# Patient Record
Sex: FEMALE | Race: WHITE | Employment: PART TIME | URBAN - METROPOLITAN AREA
[De-identification: names, ages, dates, MRNs, and addresses within clinical notes are randomized per-mention and may not be internally consistent; named-entity substitution may affect disease eponyms.]

---

## 2018-08-28 ENCOUNTER — OFFICE VISIT (OUTPATIENT)
Dept: FAMILY MEDICINE CLINIC | Age: 33
End: 2018-08-28
Payer: MEDICARE

## 2018-08-28 VITALS
OXYGEN SATURATION: 95 % | HEART RATE: 70 BPM | RESPIRATION RATE: 18 BRPM | WEIGHT: 124 LBS | BODY MASS INDEX: 21.97 KG/M2 | TEMPERATURE: 97.5 F | SYSTOLIC BLOOD PRESSURE: 118 MMHG | DIASTOLIC BLOOD PRESSURE: 80 MMHG | HEIGHT: 63 IN

## 2018-08-28 DIAGNOSIS — J02.9 SORE THROAT: ICD-10-CM

## 2018-08-28 DIAGNOSIS — J02.0 STREP THROAT: Primary | ICD-10-CM

## 2018-08-28 LAB — S PYO AG THROAT QL: POSITIVE

## 2018-08-28 PROCEDURE — G8427 DOCREV CUR MEDS BY ELIG CLIN: HCPCS | Performed by: NURSE PRACTITIONER

## 2018-08-28 PROCEDURE — G8420 CALC BMI NORM PARAMETERS: HCPCS | Performed by: NURSE PRACTITIONER

## 2018-08-28 PROCEDURE — 4004F PT TOBACCO SCREEN RCVD TLK: CPT | Performed by: NURSE PRACTITIONER

## 2018-08-28 PROCEDURE — 87880 STREP A ASSAY W/OPTIC: CPT | Performed by: NURSE PRACTITIONER

## 2018-08-28 PROCEDURE — 99201 PR OFFICE OUTPATIENT NEW 10 MINUTES: CPT | Performed by: NURSE PRACTITIONER

## 2018-08-28 RX ORDER — AZITHROMYCIN 250 MG/1
TABLET, FILM COATED ORAL
Qty: 1 PACKET | Refills: 0 | Status: SHIPPED | OUTPATIENT
Start: 2018-08-28 | End: 2019-04-22

## 2018-08-28 RX ORDER — PAROXETINE HYDROCHLORIDE 20 MG/1
20 TABLET, FILM COATED ORAL EVERY MORNING
COMMUNITY
Start: 2018-08-09

## 2018-08-28 ASSESSMENT — ENCOUNTER SYMPTOMS
DIARRHEA: 0
ABDOMINAL PAIN: 0
SORE THROAT: 1
SWOLLEN GLANDS: 1
VOMITING: 0
SHORTNESS OF BREATH: 0
COUGH: 0
RHINORRHEA: 0

## 2018-08-28 ASSESSMENT — PATIENT HEALTH QUESTIONNAIRE - PHQ9
1. LITTLE INTEREST OR PLEASURE IN DOING THINGS: 0
SUM OF ALL RESPONSES TO PHQ QUESTIONS 1-9: 0
SUM OF ALL RESPONSES TO PHQ QUESTIONS 1-9: 0
2. FEELING DOWN, DEPRESSED OR HOPELESS: 0
SUM OF ALL RESPONSES TO PHQ9 QUESTIONS 1 & 2: 0

## 2018-08-28 NOTE — PATIENT INSTRUCTIONS
fluids, such as tea or soup, may help your throat feel better. · Eat soft solids and drink plenty of clear liquids. Flavored ice pops, ice cream, scrambled eggs, sherbet, and gelatin dessert (such as Jell-O) may also soothe the throat. · Get lots of rest.  · Do not smoke, and avoid secondhand smoke. If you need help quitting, talk to your doctor about stop-smoking programs and medicines. These can increase your chances of quitting for good. · Use a vaporizer or humidifier to add moisture to the air in your bedroom. Follow the directions for cleaning the machine. When should you call for help? Call your doctor now or seek immediate medical care if:    · You have a new or higher fever.     · You have a fever with a stiff neck or severe headache.     · You have new or worse trouble swallowing.     · Your sore throat gets much worse on one side.     · Your pain becomes much worse on one side of your throat.    Watch closely for changes in your health, and be sure to contact your doctor if:    · You are not getting better after 2 days (48 hours).     · You do not get better as expected. Where can you learn more? Go to https://[employer].[email or web address][employer]. org and sign in to your [employer] account. Enter K625 in the [employer] box to learn more about \"Strep Throat: Care Instructions. \"     If you do not have an account, please click on the \"Sign Up Now\" link. Current as of: May 12, 2017  Content Version: 11.7  © 3413-1217 [employer]. Care instructions adapted under license by [facility] ([facility]). If you have questions about a medical condition or this instruction, always ask your healthcare professional. [place] [address] any warranty or liability for your use of this information.

## 2018-08-28 NOTE — PROGRESS NOTES
[address] [address]  Dept: [phone]  Dept Fax: [phone]    Andreea Hylton is a 27 y.o. female who presents to the urgent care today for her medical conditions/complaints as noted below. Robert Hodges is c/o of Pharyngitis (x 1 day - white bumps, sore to swallow )      HPI:     Sore throat  Feels like when had strep  Can swallow  No meds tried      Pharyngitis   This is a new problem. The current episode started yesterday. The problem occurs constantly. The problem has been gradually worsening. Associated symptoms include a sore throat and swollen glands. Pertinent negatives include no abdominal pain, congestion, coughing, fever, headaches, rash or vomiting. The symptoms are aggravated by swallowing. She has tried nothing for the symptoms. Past Medical History:   Diagnosis Date    Kidney infection     Kidney stone     Migraine         Current Outpatient Prescriptions   Medication Sig Dispense Refill    PARoxetine (PAXIL) 20 MG tablet       ASPIRIN-ACETAMINOPHEN PO Take by mouth      azithromycin (ZITHROMAX) 250 MG tablet Take 2 tabs (500 mg) on Day 1, and take 1 tab (250 mg) on days 2 through 5. 1 packet 0     No current facility-administered medications for this visit. Allergies   Allergen Reactions    Amoxicillin     Dicloxacillin Hives    Pcn [Penicillins]        Subjective:      Review of Systems   Constitutional: Negative for fever. HENT: Positive for sore throat. Negative for congestion and rhinorrhea. Respiratory: Negative for cough and shortness of breath. Gastrointestinal: Negative for abdominal pain, diarrhea and vomiting. Skin: Negative for rash. Neurological: Negative for headaches. All other systems reviewed and are negative. Objective:     Physical Exam   Constitutional: She is oriented to person, place, and time. She appears well-developed and well-nourished.  No

## 2018-08-31 ENCOUNTER — HOSPITAL ENCOUNTER (EMERGENCY)
Facility: CLINIC | Age: 33
Discharge: HOME | End: 2018-08-31
Attending: EMERGENCY MEDICINE
Payer: OTHER MISCELLANEOUS

## 2018-08-31 ENCOUNTER — APPOINTMENT (OUTPATIENT)
Dept: CT IMAGING | Facility: CLINIC | Age: 33
End: 2018-08-31
Payer: OTHER MISCELLANEOUS

## 2018-08-31 ENCOUNTER — APPOINTMENT (OUTPATIENT)
Dept: GENERAL RADIOLOGY | Facility: CLINIC | Age: 33
End: 2018-08-31
Payer: OTHER MISCELLANEOUS

## 2018-08-31 VITALS
HEIGHT: 63 IN | TEMPERATURE: 97.9 F | OXYGEN SATURATION: 98 % | DIASTOLIC BLOOD PRESSURE: 67 MMHG | RESPIRATION RATE: 14 BRPM | BODY MASS INDEX: 22.15 KG/M2 | SYSTOLIC BLOOD PRESSURE: 99 MMHG | HEART RATE: 62 BPM | WEIGHT: 125 LBS
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
## 2018-08-31 DIAGNOSIS — S00.83XA CONTUSION OF FACE, INITIAL ENCOUNTER: ICD-10-CM

## 2018-08-31 DIAGNOSIS — S80.01XA CONTUSION OF RIGHT KNEE, INITIAL ENCOUNTER: ICD-10-CM

## 2018-08-31 DIAGNOSIS — S81.011A LACERATION OF RIGHT KNEE, INITIAL ENCOUNTER: ICD-10-CM

## 2018-08-31 DIAGNOSIS — S40.022A CONTUSION OF LEFT UPPER ARM, INITIAL ENCOUNTER: ICD-10-CM

## 2018-08-31 PROCEDURE — 6370000000 HC RX 637 (ALT 250 FOR IP): Performed by: EMERGENCY MEDICINE

## 2018-08-31 PROCEDURE — 70450 CT HEAD/BRAIN W/O DYE: CPT

## 2018-08-31 PROCEDURE — 73060 X-RAY EXAM OF HUMERUS: CPT

## 2018-08-31 PROCEDURE — 6360000002 HC RX W HCPCS: Performed by: EMERGENCY MEDICINE

## 2018-08-31 PROCEDURE — 2500000003 HC RX 250 WO HCPCS: Performed by: EMERGENCY MEDICINE

## 2018-08-31 PROCEDURE — 90715 TDAP VACCINE 7 YRS/> IM: CPT | Performed by: EMERGENCY MEDICINE

## 2018-08-31 PROCEDURE — 99284 EMERGENCY DEPT VISIT MOD MDM: CPT

## 2018-08-31 PROCEDURE — 73562 X-RAY EXAM OF KNEE 3: CPT

## 2018-08-31 PROCEDURE — 90471 IMMUNIZATION ADMIN: CPT | Performed by: EMERGENCY MEDICINE

## 2018-08-31 PROCEDURE — 72125 CT NECK SPINE W/O DYE: CPT

## 2018-08-31 PROCEDURE — 70486 CT MAXILLOFACIAL W/O DYE: CPT

## 2018-08-31 PROCEDURE — 12002 RPR S/N/AX/GEN/TRNK2.6-7.5CM: CPT

## 2018-08-31 RX ORDER — HYDROCODONE BITARTRATE AND ACETAMINOPHEN 5; 325 MG/1; MG/1
1 TABLET ORAL ONCE
Status: COMPLETED | OUTPATIENT
Start: 2018-08-31 | End: 2018-08-31

## 2018-08-31 RX ORDER — CETIRIZINE HYDROCHLORIDE 10 MG/1
10 TABLET ORAL DAILY
COMMUNITY
End: 2019-09-12

## 2018-08-31 RX ORDER — LIDOCAINE/RACEPINEP/TETRACAINE 4-0.05-0.5
SOLUTION WITH PREFILLED APPLICATOR (ML) TOPICAL ONCE
Status: COMPLETED | OUTPATIENT
Start: 2018-08-31 | End: 2018-08-31

## 2018-08-31 RX ORDER — GINSENG 100 MG
CAPSULE ORAL ONCE
Status: COMPLETED | OUTPATIENT
Start: 2018-08-31 | End: 2018-08-31

## 2018-08-31 RX ADMIN — Medication 3 ML: at 17:45

## 2018-08-31 RX ADMIN — HYDROCODONE BITARTRATE AND ACETAMINOPHEN 1 TABLET: 5; 325 TABLET ORAL at 17:45

## 2018-08-31 RX ADMIN — TETANUS TOXOID, REDUCED DIPHTHERIA TOXOID AND ACELLULAR PERTUSSIS VACCINE, ADSORBED 0.5 ML: 5; 2.5; 8; 8; 2.5 SUSPENSION INTRAMUSCULAR at 17:45

## 2018-08-31 RX ADMIN — BACITRACIN: 500 OINTMENT TOPICAL at 18:40

## 2018-08-31 ASSESSMENT — PAIN DESCRIPTION - PAIN TYPE
TYPE: ACUTE PAIN

## 2018-08-31 ASSESSMENT — PAIN SCALES - GENERAL
PAINLEVEL_OUTOF10: 10
PAINLEVEL_OUTOF10: 10
PAINLEVEL_OUTOF10: 8

## 2018-08-31 NOTE — ED PROVIDER NOTES
PAROXETINE (PAXIL) 20 MG TABLET    Take 20 mg by mouth every morning        ALLERGIES     is allergic to amoxicillin; dicloxacillin; and pcn [penicillins]. FAMILY HISTORY     has no family status information on file. family history is not on file. SOCIAL HISTORY      reports that she has been smoking. She has been smoking about 0.25 packs per day. She has never used smokeless tobacco. She reports that she drinks alcohol. She reports that she does not use drugs. PHYSICAL EXAM       ED Triage Vitals [08/31/18 1728]   BP Temp Temp Source Pulse Resp SpO2 Height Weight   (!) 102/59 97.9 °F (36.6 °C) Oral 64 16 98 % 5' 3\" (1.6 m) 125 lb (56.7 kg)       Constitutional: Alert, oriented x3, nontoxic, answering questions appropriately, acting properly for age, in no acute distress   HEENT: Extraocular muscles intact, mucus membranes moist, TMs clear bilaterally, no posterior pharyngeal erythema or exudates, Pupils equal, round, reactive to light, periorbital swelling with infraorbital ecchymosis and swelling greater than supraorbital.  No signs of entrapment. Neck: Trachea midline mild posterior paraspinal musculature tenderness to palpation hypersensitive to touch. Cardiovascular: Regular rhythm and rate no S3, S4, or murmurs   Respiratory: Clear to auscultation bilaterally no wheezes, rhonchi, rales, no respiratory distress no tachypnea no retractions no hypoxia  Gastrointestinal: Soft, nontender, nondistended, positive bowel sounds. No rebound, rigidity, or guarding. Musculoskeletal: Left upper shoulder no pain at the shoulder elbow or wrist.  Radial and ulnar arteries intact. Capillary refill less than 2 seconds there is ecchymosis with swelling to the mid lateral humerus. Ecchymosis measuring approximately 10 cm in diameter. Right lower extremity there is no pain at the hip knee or ankle.   There is a linear 4 cm laceration to the anteromedial aspect of the right knee with diffuse tenderness no ecchymosis no swelling. Neurologic: No motor or sensory deficits. Skin: Warm and dry     Physical Exam  DIFFERENTIAL DIAGNOSIS/ MDM:     Update tetanus. Pain control. CT head facial bones and cervical spine. X-ray left humerus, right knee. Suture repair. DIAGNOSTIC RESULTS     EKG: All EKG's are interpreted by the Emergency Department Physician who either signs or Co-signs this chart in the absence of a cardiologist.        Not indicated unless otherwise documented above    LABS:  No results found for this visit on 08/31/18. Not indicated unless otherwise documented above    RADIOLOGY:   I reviewed the radiologist interpretations:    CT Cervical Spine WO Contrast   Final Result   Unremarkable CT examination of the cervical spine. CT FACIAL BONES WO CONTRAST   Final Result   Negative CT brain with no acute intracranial abnormality. Negative CT with no acute traumatic injury of the facial bones. Incidental bilateral maxillary sinusitis as described. CT Head WO Contrast   Final Result   Negative CT brain with no acute intracranial abnormality. Negative CT with no acute traumatic injury of the facial bones. Incidental bilateral maxillary sinusitis as described. XR KNEE RIGHT (3 VIEWS)   Final Result   No acute bony abnormality of the knee. XR HUMERUS LEFT (MIN 2 VIEWS)   Final Result   No acute osseous abnormality.              Not indicated unless otherwise documented above    EMERGENCY DEPARTMENT COURSE:     The patient was given the following medications:  Orders Placed This Encounter   Medications    Tetanus-Diphth-Acell Pertussis (BOOSTRIX) injection 0.5 mL    HYDROcodone-acetaminophen (NORCO) 5-325 MG per tablet 1 tablet    lidocaine-EPINEPHrine-tetracaine gel    bacitracin ointment        Vitals:    Vitals:    08/31/18 1728   BP: (!) 102/59   Pulse: 64   Resp: 16   Temp: 97.9 °F (36.6 °C)   TempSrc: Oral   SpO2: 98%   Weight: 56.7 kg (125 lb) of this note were completed with a voice recognition program.  Efforts were made to edit the dictations but occasionally words are mis-transcribed.)    Rosario DO  Attending Emergency Physician        Robert Encarnacion DO  08/31/18 2790

## 2019-04-22 ENCOUNTER — HOSPITAL ENCOUNTER (EMERGENCY)
Facility: CLINIC | Age: 34
Discharge: HOME | End: 2019-04-22
Attending: EMERGENCY MEDICINE
Payer: MEDICARE

## 2019-04-22 VITALS
TEMPERATURE: 98 F | BODY MASS INDEX: 22.14 KG/M2 | HEART RATE: 86 BPM | OXYGEN SATURATION: 98 % | DIASTOLIC BLOOD PRESSURE: 82 MMHG | WEIGHT: 125 LBS | SYSTOLIC BLOOD PRESSURE: 109 MMHG | RESPIRATION RATE: 16 BRPM

## 2019-04-22 DIAGNOSIS — J01.00 ACUTE MAXILLARY SINUSITIS, RECURRENCE NOT SPECIFIED: Primary | ICD-10-CM

## 2019-04-22 DIAGNOSIS — J40 BRONCHITIS: ICD-10-CM

## 2019-04-22 PROCEDURE — 99282 EMERGENCY DEPT VISIT SF MDM: CPT

## 2019-04-22 RX ORDER — AZITHROMYCIN 250 MG/1
TABLET, FILM COATED ORAL
Qty: 1 PACKET | Refills: 0 | Status: SHIPPED | OUTPATIENT
Start: 2019-04-22 | End: 2019-05-02

## 2019-04-22 RX ORDER — PREDNISONE 10 MG/1
TABLET ORAL
Qty: 20 TABLET | Refills: 0 | Status: SHIPPED | OUTPATIENT
Start: 2019-04-22 | End: 2019-05-02

## 2019-04-22 ASSESSMENT — PAIN SCALES - GENERAL: PAINLEVEL_OUTOF10: 3

## 2019-04-22 ASSESSMENT — ENCOUNTER SYMPTOMS
DIARRHEA: 0
VOMITING: 0
SINUS PRESSURE: 1
COUGH: 1
SORE THROAT: 1
SINUS PAIN: 1
WHEEZING: 0

## 2019-04-22 ASSESSMENT — PAIN DESCRIPTION - LOCATION: LOCATION: THROAT

## 2019-04-22 ASSESSMENT — PAIN DESCRIPTION - PAIN TYPE: TYPE: ACUTE PAIN

## 2019-04-22 NOTE — ED PROVIDER NOTES
the ER for worsening symptoms chest pain , increasing shortness of breath fever greater than 101, vomiting or other concerns. Otherwise, she is to follow-up with the family doctor of her choice calling [phone] for an appointment    DIAGNOSTIC RESULTS         LABS:  No results found for this visit on 04/22/19. EMERGENCY DEPARTMENT COURSE:   Vitals:    Vitals:    04/22/19 1144   BP: 109/82   Pulse: 86   Resp: 16   Temp: 98 °F (36.7 °C)   TempSrc: Oral   SpO2: 98%   Weight: 56.7 kg (125 lb)     -------------------------  BP: 109/82, Temp: 98 °F (36.7 °C), Pulse: 86, Resp: 16      RE-EVALUATION:  At this time the patient is without objective evidence of an acute process requiring hospitalization or inpatient management. They have remained hemodynamically stable throughout their entire ED visit and are stable for discharge with outpatient follow-up. The plan has been discussed in detail and they are aware of the specific conditions for emergent return, as well as the importance of follow-up    I have reviewed the disposition diagnosis with the patient and or their family/guardian. I have answered their questions and given discharge instructions. They voiced understanding of these instructions and did not have any further questions or complaints. PROCEDURES:  None    FINAL IMPRESSION      1. Acute maxillary sinusitis, recurrence not specified    2. Bronchitis          DISPOSITION/PLAN   DISPOSITION Decision To Discharge 04/22/2019 11:58:09 AM      CONDITION ON DISPOSITION:   Stable    PATIENT REFERRED TO:    Family Doctor of Choice Calling [phone]  Call in 2 days        DISCHARGE MEDICATIONS:  New Prescriptions    AZITHROMYCIN (ZITHROMAX) 250 MG TABLET    Take 2 tablets (500 mg) on Day 1, followed by 1 tablet (250 mg) once daily on Days 2 through 5.     PREDNISONE (DELTASONE) 10 MG TABLET    Take 4 tablets by mouth once daily for 5 days       (Please note that portions of this note were completed with a voicerecognition program.  Efforts were made to edit the dictations but occasionally words are mis-transcribed.)    Gillis MD, F.A.C.E.P.   Attending Emergency Medicine Physician       Roxanna Reyes MD  04/22/19 [id number] - - -

## 2019-04-22 NOTE — LETTER
[facility] ED  [facility] [address]  Phone: [phone]               April 22, 2019    Patient: Petros Hawkins   YOB: 1985   Date of Visit: 4/22/2019       To Whom It May Concern:    Robert Josephtomas was seen and treated in our emergency department on 4/22/2019. She may return to work on 4/23/2018.       Sincerely,               Signature:__________________________________

## 2019-09-12 ENCOUNTER — HOSPITAL ENCOUNTER (EMERGENCY)
Facility: CLINIC | Age: 34
Discharge: HOME | End: 2019-09-12
Attending: EMERGENCY MEDICINE
Payer: MEDICARE

## 2019-09-12 VITALS
TEMPERATURE: 98.3 F | HEIGHT: 63 IN | OXYGEN SATURATION: 98 % | HEART RATE: 78 BPM | WEIGHT: 125 LBS | SYSTOLIC BLOOD PRESSURE: 110 MMHG | RESPIRATION RATE: 16 BRPM | BODY MASS INDEX: 22.15 KG/M2 | DIASTOLIC BLOOD PRESSURE: 70 MMHG

## 2019-09-12 DIAGNOSIS — T65.91XD ALLERGIC REACTION TO CHEMICAL SUBSTANCE, ACCIDENTAL OR UNINTENTIONAL, SUBSEQUENT ENCOUNTER: Primary | ICD-10-CM

## 2019-09-12 PROCEDURE — 99284 EMERGENCY DEPT VISIT MOD MDM: CPT

## 2019-09-12 PROCEDURE — 96375 TX/PRO/DX INJ NEW DRUG ADDON: CPT

## 2019-09-12 PROCEDURE — 96374 THER/PROPH/DIAG INJ IV PUSH: CPT

## 2019-09-12 PROCEDURE — 6360000002 HC RX W HCPCS: Performed by: EMERGENCY MEDICINE

## 2019-09-12 PROCEDURE — 2500000003 HC RX 250 WO HCPCS: Performed by: EMERGENCY MEDICINE

## 2019-09-12 RX ORDER — DIPHENHYDRAMINE HCL 25 MG
25 CAPSULE ORAL EVERY 6 HOURS PRN
Qty: 12 CAPSULE | Refills: 0 | Status: SHIPPED | OUTPATIENT
Start: 2019-09-12 | End: 2019-09-16

## 2019-09-12 RX ORDER — DIPHENHYDRAMINE HYDROCHLORIDE 50 MG/ML
25 INJECTION INTRAMUSCULAR; INTRAVENOUS ONCE
Status: COMPLETED | OUTPATIENT
Start: 2019-09-12 | End: 2019-09-12

## 2019-09-12 RX ORDER — ALBUTEROL SULFATE 90 UG/1
2 AEROSOL, METERED RESPIRATORY (INHALATION) EVERY 4 HOURS PRN
Qty: 1 INHALER | Refills: 0 | Status: SHIPPED | OUTPATIENT
Start: 2019-09-12

## 2019-09-12 RX ORDER — METHYLPREDNISOLONE SODIUM SUCCINATE 125 MG/2ML
125 INJECTION, POWDER, LYOPHILIZED, FOR SOLUTION INTRAMUSCULAR; INTRAVENOUS ONCE
Status: COMPLETED | OUTPATIENT
Start: 2019-09-12 | End: 2019-09-12

## 2019-09-12 RX ORDER — PREDNISONE 10 MG/1
TABLET ORAL
Qty: 20 TABLET | Refills: 0 | Status: SHIPPED | OUTPATIENT
Start: 2019-09-12 | End: 2019-09-22

## 2019-09-12 RX ADMIN — METHYLPREDNISOLONE SODIUM SUCCINATE 125 MG: 125 INJECTION, POWDER, FOR SOLUTION INTRAMUSCULAR; INTRAVENOUS at 18:24

## 2019-09-12 RX ADMIN — DIPHENHYDRAMINE HYDROCHLORIDE 25 MG: 50 INJECTION, SOLUTION INTRAMUSCULAR; INTRAVENOUS at 18:23

## 2019-09-12 RX ADMIN — FAMOTIDINE 20 MG: 10 INJECTION, SOLUTION INTRAVENOUS at 18:24

## 2019-09-12 ASSESSMENT — PAIN SCALES - GENERAL: PAINLEVEL_OUTOF10: 5

## 2019-09-12 ASSESSMENT — PAIN DESCRIPTION - LOCATION: LOCATION: CHEST

## 2019-09-12 ASSESSMENT — PAIN DESCRIPTION - PAIN TYPE: TYPE: ACUTE PAIN

## 2019-09-13 NOTE — ED PROVIDER NOTES
1. Allergic reaction to chemical substance, accidental or unintentional, subsequent encounter          DISPOSITION/PLAN   DISPOSITION Decision To Discharge 09/12/2019 08:08:31 PM    I have reviewed the disposition diagnosis with the patient and or their family/guardian. I have answered their questions and given discharge instructions. They voiced understanding of these instructions and did not have any further questions or complaints. Reevaluation: Patient is asymptomatic at this point in time has no wheezing or stridor maybe has some mild nasal congestion as a residual.  Patient will be discharged to home on albuterol inhaler prednisone and Benadryl. She is to follow-up with her family doctor for referral for allergy testing she is to avoid the noxious stimuli that cause this problem and return if worse. Condition on Disposition    Improved    PATIENT REFERRED TO:    Family doctor in 2 days  In 2 days        DISCHARGE MEDICATIONS:  New Prescriptions    ALBUTEROL SULFATE HFA (PROVENTIL HFA) 108 (90 BASE) MCG/ACT INHALER    Inhale 2 puffs into the lungs every 4 hours as needed for Wheezing or Shortness of Breath (Space out to every 6 hours as symptoms improve) Space out to every 6 hours as symptoms improve.     DIPHENHYDRAMINE (BENADRYL) 25 MG CAPSULE    Take 1 capsule by mouth every 6 hours as needed for Itching    PREDNISONE (DELTASONE) 10 MG TABLET    Take 4 tablets by mouth once daily for 5 days       (Please note that portions of this note were completed with a voice recognition program.  Efforts were made to edit the dictations but occasionally words are mis-transcribed.)    Lopez MD  Attending Emergency Physician        Ciera Higuera MD  09/12/19 2011